# Patient Record
Sex: FEMALE | Race: WHITE | Employment: FULL TIME | URBAN - METROPOLITAN AREA
[De-identification: names, ages, dates, MRNs, and addresses within clinical notes are randomized per-mention and may not be internally consistent; named-entity substitution may affect disease eponyms.]

---

## 2020-02-03 ENCOUNTER — HOSPITAL ENCOUNTER (OUTPATIENT)
Dept: OCCUPATIONAL THERAPY | Age: 55
Setting detail: THERAPIES SERIES
Discharge: HOME | End: 2020-02-03
Payer: COMMERCIAL

## 2020-02-03 PROCEDURE — 97166 OT EVAL MOD COMPLEX 45 MIN: CPT

## 2020-02-03 PROCEDURE — 97760 ORTHOTIC MGMT&TRAING 1ST ENC: CPT

## 2020-02-03 NOTE — PLAN OF CARE
[x][facility] [place] [address]      BETHEL PRADHAN [address]       [facility], [address], [address] [facility],Third Floor       Alison Hollis [address]     [phone] [phone] LOL:[phone]  ________________________________________________________________________    Physician:    Dr Fernando  From: [facility]  Patient: Gwendel Schilder      : 1965  Diagnosis:   ORIF neck of R fifth Metacarpal  Physician ICD 10 Code: W59.270Y   Treatment Diagnosis:   Hand pain  ICD10 tx code: M79.641  Date: 2/3/2020     MRN: [id number]     Occupational Therapy Certification/Re-Certification Form  Dear Dr. Enriqueta Morris  The following patient has been evaluated for occupational therapy services and for therapy to continue, insurance requires physician review of the treatment plan initially and every 90 days. Please review the attached evaluation and/or summary of the patient's plan of care, and verify that you agree therapy should continue by signing the attached document and sending it back to our office. Plan of Care/Treatment to date:  [x] Therapeutic Exercise   [x] Modalities: prn  [x] Therapeutic Activity    [] Ultrasound [] Elec Stimulation   [] Total Motion Release    [] Fluido [] Kinesiotaping  [] Neuromuscular Re-education   [] Ionto [] Coldpack/hotpack   [x] Instruction in HEP    Other:  [x] Manual Therapy     [x]  Edema control  [] Aquatic Therapy     [x] ?scar management      Frequency/Duration:  # Days per week: [] 1 day # Weeks: [] 1 week [] 5 weeks     [x] 2 days?    [] 2 weeks [x] 6 weeks     [x] 3 days   [] 3 weeks [] 7 weeks     [] 4 days   [x] 4 weeks [] 8 weeks         [] 9 weeks [] 10 weeks         [] 11 weeks [] 12 weeks    Rehab Potential/Progress: [] excellent [x] good [] fair  [] poor       Goals:  Goals   Pt will increase AROM R hand to WNL to increase functional mobility   Pt will follow thru and be independent with HEP

## 2020-02-03 NOTE — FLOWSHEET NOTE
Occupational Therapy Out Patient Daily Treatment Note     [x][place] [address]      BETHEL [facility]     [address] [facility] [address]  [facility] access hospital [facility]       [facility], [address], [address]       Alison Kumar [address]     [phone]  [phone] [phone]  ______________________________________________________________________  Date:  2/3/2020  Patient Name:  Philomena Morrison    :  1965  Restrictions/Precautions:  General  Diagnosis:   Fx RSF neck metacarpal  Treatment Diagnosis:   hand pain  Insurance/Certification information: [facility]  Referring Physician:   Dr Anders Russell of care signed (Y/N):    Visit# / total visits: -  Pain level: 8-9/10     Subjective:   Prior Level of Function:  Full functional use of R hand  Patient Goals: Return to PLOF    Treatment Flowsheet   Right Left     Removed bulky dressing x    Applied light dressing x    AROM adjacent fingers x    Blocking with AROM IPs x    Fabricated hand based ulnar gutter splint x    Issued and instructed in HEP x                                                                                                 Interventions/Modalities used:  [x] Therapeutic Exercise   [x] Modalities:prn  [x] Therapeutic Activity    [] Ultrasound [] Elec Stimulation   [] Total Motion Release    [] Fluido [] Kinesiotaping  [] Neuromuscular Re-education   [] Ionto [] Coldpack/hotpack   [x] Instruction in HEP    Other:  Scar management and edema control  Objective Findings: See eval  Communication with other providers: POC to physician  Education provided to patient: instructed in HEP    Adverse Reactions to treatment: none    Time in:  1450  Time out: 1520  Timed treatment minutes:  15  Total treatment time:  30      If [facility] Please Indicate Time In/Out  CPT Code Time in Time out                                                              Treatment/Activity Tolerance:     [x]  Patient tolerated treatment well []  Patient limited by fatique    []  Patient limited by pain []  Patient limited by other medical complications   []  Other:     Goals   Pt will increase AROM L hand to WNL to increase functional mobility   Pt will follow thru and be independent with HEP and splint wear and care   Edema control   Scar  Management     LTG:  Pt will decrease quickdash below 30 for significant performance improvement    Patient Requires Follow-up:  [x]  Yes  []  No    Plan: []  Continue per plan of care []  Alter current plan (see comments)   [x]  Plan of care initiated []  Hold pending MD visit []  Discharge    Plan for Next Session:      Electronically signed by:  ERICA Flores/L, 2/3/2020, 6:48 PM

## 2020-02-07 ENCOUNTER — HOSPITAL ENCOUNTER (OUTPATIENT)
Dept: OCCUPATIONAL THERAPY | Age: 55
Setting detail: THERAPIES SERIES
Discharge: HOME | End: 2020-02-07
Payer: COMMERCIAL

## 2020-02-07 PROCEDURE — 97110 THERAPEUTIC EXERCISES: CPT

## 2020-02-07 NOTE — FLOWSHEET NOTE
Occupational Therapy Out Patient Daily Treatment Note     [x][place] [address]      BETHEL [facility]     [address] [facility] [address] [place] [address]       [facility], [address], [address]       [address] [place]     [phone]  [phone] [phone]  ______________________________________________________________________  Date:  2020  Patient Name:  Papi Hayes    :  1965  Restrictions/Precautions:  General  Diagnosis:   Fx RSF neck metacarpal  Treatment Diagnosis:   hand pain  Insurance/Certification information: [facility]  Referring Physician:   Dr Abilio Carlos of care signed (Y/N):    Visit# / total visits: -  Pain level: 7/10     Subjective: States has been doing well. Swelling is going down.   Prior Level of Function:  Full functional use of R hand  Patient Goals: Return to PLOF    Treatment Flowsheet   Right Left   Cleansed wound x    Applied light dressing x    AROM adjacent fingers x    Blocking with AROM IPs x    Fabricated hand based ulnar gutter splint x    Issued and instructed in HEP x                                                                                                 Interventions/Modalities used:  [x] Therapeutic Exercise   [x] Modalities:prn  [x] Therapeutic Activity    [] Ultrasound [] Elec Stimulation   [] Total Motion Release    [] Fluido [] Kinesiotaping  [] Neuromuscular Re-education   [] Ionto [] Coldpack/hotpack   [x] Instruction in HEP    Other:  Scar management and edema control  Objective Findings:  IPs stiff initially and after ex [facility]  Communication with other providers: POC to physician  Education provided to patient: instructed in HEP    Adverse Reactions to treatment: none    Time in:  1600  Time out: 1615  Timed treatment minutes:  15  Total treatment time:  15      If [facility] Please Indicate Time In/Out  CPT Code Time in Time out Treatment/Activity Tolerance:     [x]  Patient tolerated treatment well []  Patient limited by fatique    []  Patient limited by pain []  Patient limited by other medical complications   []  Other:     Goals   Pt will increase AROM L hand to WNL to increase functional mobility   Pt will follow thru and be independent with HEP and splint wear and care   Edema control   Scar  Management     LTG:  Pt will decrease quickdash below 30 for significant performance improvement    Patient Requires Follow-up:  [x]  Yes  []  No    Plan: [x]  Continue per plan of care []  Alter current plan (see comments)   [x]  Plan of care initiated []  Hold pending MD visit []  Discharge    Plan for Next Session:      Electronically signed by:  ERICA Lawler/L, 2/7/2020, 4:43 PM

## 2020-02-14 ENCOUNTER — HOSPITAL ENCOUNTER (OUTPATIENT)
Dept: OCCUPATIONAL THERAPY | Age: 55
Setting detail: THERAPIES SERIES
Discharge: HOME | End: 2020-02-14
Payer: COMMERCIAL

## 2020-02-14 PROCEDURE — 97110 THERAPEUTIC EXERCISES: CPT

## 2020-02-14 PROCEDURE — 97140 MANUAL THERAPY 1/> REGIONS: CPT

## 2020-02-21 ENCOUNTER — HOSPITAL ENCOUNTER (OUTPATIENT)
Dept: OCCUPATIONAL THERAPY | Age: 55
Setting detail: THERAPIES SERIES
Discharge: HOME | End: 2020-02-21
Payer: COMMERCIAL

## 2020-02-21 PROCEDURE — 97140 MANUAL THERAPY 1/> REGIONS: CPT

## 2020-02-21 PROCEDURE — 97110 THERAPEUTIC EXERCISES: CPT

## 2020-02-21 NOTE — FLOWSHEET NOTE
Treatment/Activity Tolerance:     [x]  Patient tolerated treatment well []  Patient limited by fatique    []  Patient limited by pain []  Patient limited by other medical complications   []  Other:     Goals   Pt will increase AROM L hand to WNL to increase functional mobility met   Pt will follow thru and be independent with HEP and splint wear and care met   Edema control-met   Scar  Management-not visible.  Flat and soft     LTG:  Pt will decrease quickdash below 30 for significant performance improvement-18    Patient Requires Follow-up:  [x]  Yes  []  No    Plan: [x]  Continue per plan of care []  Alter current plan (see comments)   [x]  Plan of care initiated []  Hold pending MD visit [x]  Discharge    Plan for Next Session:  Instructed to call if any problems    Electronically signed by:  Gerhardt Scot Royse,OTR/L, 2/21/2020, 11:30 AM